# Patient Record
Sex: MALE | Race: OTHER | HISPANIC OR LATINO | URBAN - METROPOLITAN AREA
[De-identification: names, ages, dates, MRNs, and addresses within clinical notes are randomized per-mention and may not be internally consistent; named-entity substitution may affect disease eponyms.]

---

## 2017-04-19 ENCOUNTER — EMERGENCY (EMERGENCY)
Facility: HOSPITAL | Age: 47
LOS: 1 days | Discharge: HOME | End: 2017-04-19
Attending: EMERGENCY MEDICINE | Admitting: EMERGENCY MEDICINE
Payer: SELF-PAY

## 2017-04-19 VITALS
RESPIRATION RATE: 16 BRPM | WEIGHT: 223.99 LBS | DIASTOLIC BLOOD PRESSURE: 94 MMHG | HEART RATE: 89 BPM | OXYGEN SATURATION: 99 % | SYSTOLIC BLOOD PRESSURE: 164 MMHG | HEIGHT: 73 IN

## 2017-04-19 VITALS
HEART RATE: 80 BPM | DIASTOLIC BLOOD PRESSURE: 99 MMHG | OXYGEN SATURATION: 100 % | TEMPERATURE: 98 F | SYSTOLIC BLOOD PRESSURE: 180 MMHG | RESPIRATION RATE: 15 BRPM

## 2017-04-19 LAB
ALBUMIN SERPL ELPH-MCNC: 4.5 G/DL — SIGNIFICANT CHANGE UP (ref 3.3–5)
ALP SERPL-CCNC: 54 U/L — SIGNIFICANT CHANGE UP (ref 40–120)
ALT FLD-CCNC: 41 U/L — SIGNIFICANT CHANGE UP (ref 4–41)
APPEARANCE UR: CLEAR — SIGNIFICANT CHANGE UP
AST SERPL-CCNC: 29 U/L — SIGNIFICANT CHANGE UP (ref 4–40)
BILIRUB SERPL-MCNC: 0.3 MG/DL — SIGNIFICANT CHANGE UP (ref 0.2–1.2)
BILIRUB UR-MCNC: NEGATIVE — SIGNIFICANT CHANGE UP
BLOOD UR QL VISUAL: NEGATIVE — SIGNIFICANT CHANGE UP
BUN SERPL-MCNC: 13 MG/DL — SIGNIFICANT CHANGE UP (ref 7–23)
CALCIUM SERPL-MCNC: 9.9 MG/DL — SIGNIFICANT CHANGE UP (ref 8.4–10.5)
CHLORIDE SERPL-SCNC: 99 MMOL/L — SIGNIFICANT CHANGE UP (ref 98–107)
CO2 SERPL-SCNC: 23 MMOL/L — SIGNIFICANT CHANGE UP (ref 22–31)
COLOR SPEC: SIGNIFICANT CHANGE UP
CREAT SERPL-MCNC: 1.2 MG/DL — SIGNIFICANT CHANGE UP (ref 0.5–1.3)
GLUCOSE SERPL-MCNC: 112 MG/DL — HIGH (ref 70–99)
GLUCOSE UR-MCNC: NEGATIVE — SIGNIFICANT CHANGE UP
HCT VFR BLD CALC: 47.7 % — SIGNIFICANT CHANGE UP (ref 39–50)
HGB BLD-MCNC: 16.4 G/DL — SIGNIFICANT CHANGE UP (ref 13–17)
KETONES UR-MCNC: NEGATIVE — SIGNIFICANT CHANGE UP
LEUKOCYTE ESTERASE UR-ACNC: NEGATIVE — SIGNIFICANT CHANGE UP
MCHC RBC-ENTMCNC: 31.1 PG — SIGNIFICANT CHANGE UP (ref 27–34)
MCHC RBC-ENTMCNC: 34.4 % — SIGNIFICANT CHANGE UP (ref 32–36)
MCV RBC AUTO: 90.5 FL — SIGNIFICANT CHANGE UP (ref 80–100)
MUCOUS THREADS # UR AUTO: SIGNIFICANT CHANGE UP
NITRITE UR-MCNC: NEGATIVE — SIGNIFICANT CHANGE UP
NON-SQ EPI CELLS # UR AUTO: <1 — SIGNIFICANT CHANGE UP
PH UR: 5.5 — SIGNIFICANT CHANGE UP (ref 4.6–8)
PLATELET # BLD AUTO: 189 K/UL — SIGNIFICANT CHANGE UP (ref 150–400)
PMV BLD: 11.9 FL — SIGNIFICANT CHANGE UP (ref 7–13)
POTASSIUM SERPL-MCNC: 4.8 MMOL/L — SIGNIFICANT CHANGE UP (ref 3.5–5.3)
POTASSIUM SERPL-SCNC: 4.8 MMOL/L — SIGNIFICANT CHANGE UP (ref 3.5–5.3)
PROT SERPL-MCNC: 7.5 G/DL — SIGNIFICANT CHANGE UP (ref 6–8.3)
PROT UR-MCNC: 10 — SIGNIFICANT CHANGE UP
RBC # BLD: 5.27 M/UL — SIGNIFICANT CHANGE UP (ref 4.2–5.8)
RBC # FLD: 12.8 % — SIGNIFICANT CHANGE UP (ref 10.3–14.5)
RBC CASTS # UR COMP ASSIST: SIGNIFICANT CHANGE UP (ref 0–?)
SODIUM SERPL-SCNC: 140 MMOL/L — SIGNIFICANT CHANGE UP (ref 135–145)
SP GR SPEC: 1.02 — SIGNIFICANT CHANGE UP (ref 1–1.03)
UROBILINOGEN FLD QL: NORMAL E.U. — SIGNIFICANT CHANGE UP (ref 0.1–0.2)
WBC # BLD: 10.35 K/UL — SIGNIFICANT CHANGE UP (ref 3.8–10.5)
WBC # FLD AUTO: 10.35 K/UL — SIGNIFICANT CHANGE UP (ref 3.8–10.5)
WBC UR QL: SIGNIFICANT CHANGE UP (ref 0–?)

## 2017-04-19 PROCEDURE — 70450 CT HEAD/BRAIN W/O DYE: CPT | Mod: 26

## 2017-04-19 PROCEDURE — 99284 EMERGENCY DEPT VISIT MOD MDM: CPT | Mod: 25

## 2017-04-19 PROCEDURE — 93010 ELECTROCARDIOGRAM REPORT: CPT

## 2017-04-19 RX ORDER — AMLODIPINE BESYLATE 2.5 MG/1
1 TABLET ORAL
Qty: 30 | Refills: 0 | OUTPATIENT
Start: 2017-04-19 | End: 2017-05-19

## 2017-04-19 RX ORDER — SODIUM CHLORIDE 9 MG/ML
1000 INJECTION INTRAMUSCULAR; INTRAVENOUS; SUBCUTANEOUS ONCE
Qty: 0 | Refills: 0 | Status: COMPLETED | OUTPATIENT
Start: 2017-04-19 | End: 2017-04-19

## 2017-04-19 RX ORDER — AMLODIPINE BESYLATE 2.5 MG/1
5 TABLET ORAL ONCE
Qty: 0 | Refills: 0 | Status: COMPLETED | OUTPATIENT
Start: 2017-04-19 | End: 2017-04-19

## 2017-04-19 RX ADMIN — SODIUM CHLORIDE 500 MILLILITER(S): 9 INJECTION INTRAMUSCULAR; INTRAVENOUS; SUBCUTANEOUS at 15:25

## 2017-04-19 RX ADMIN — AMLODIPINE BESYLATE 5 MILLIGRAM(S): 2.5 TABLET ORAL at 16:35

## 2017-04-19 NOTE — ED PROVIDER NOTE - OBJECTIVE STATEMENT
47 yo male with no sig pmh presents with dizziness, n, v. Pt was at work, lifting things and suddenly developed sensation of things spinning and had to hold on to things to walk, vomited twice, initially it was not as bad and he continued to work but then he had to stop and someone took his BP and it was elevated so he was advised to come to the ER. He also states when he turned his head the dizziness was worse but now all gone. He denies CP, SOB, abd pain, numbness, weakness, HA, diplopia and no previous episode of this. He has a FH of HTN but never was told he had it.

## 2017-04-19 NOTE — ED PROVIDER NOTE - NEUROLOGICAL, MLM
Alert and oriented, no focal deficits, no motor or sensory deficits. CN 3-12 intact, FTN normal, sensory intact, motor 5/5 UE and LE

## 2017-04-19 NOTE — ED ADULT TRIAGE NOTE - CHIEF COMPLAINT QUOTE
c/o dizziness with blurry vision and nausea while at work today. Was taken to health office and was told BP reading was high. [id number]

## 2017-04-20 ENCOUNTER — EMERGENCY (EMERGENCY)
Facility: HOSPITAL | Age: 47
LOS: 1 days | Discharge: HOME | End: 2017-04-20
Attending: EMERGENCY MEDICINE | Admitting: EMERGENCY MEDICINE
Payer: SELF-PAY

## 2017-04-20 VITALS
RESPIRATION RATE: 18 BRPM | DIASTOLIC BLOOD PRESSURE: 94 MMHG | OXYGEN SATURATION: 97 % | TEMPERATURE: 98 F | SYSTOLIC BLOOD PRESSURE: 147 MMHG | HEART RATE: 93 BPM

## 2017-04-20 VITALS
OXYGEN SATURATION: 99 % | SYSTOLIC BLOOD PRESSURE: 166 MMHG | RESPIRATION RATE: 16 BRPM | HEART RATE: 79 BPM | DIASTOLIC BLOOD PRESSURE: 94 MMHG

## 2017-04-20 PROBLEM — Z00.00 ENCOUNTER FOR PREVENTIVE HEALTH EXAMINATION: Status: ACTIVE | Noted: 2017-04-20

## 2017-04-20 PROCEDURE — 99285 EMERGENCY DEPT VISIT HI MDM: CPT

## 2017-04-20 RX ORDER — MECLIZINE HCL 12.5 MG
25 TABLET ORAL ONCE
Qty: 0 | Refills: 0 | Status: COMPLETED | OUTPATIENT
Start: 2017-04-20 | End: 2017-04-20

## 2017-04-20 RX ORDER — ONDANSETRON 8 MG/1
4 TABLET, FILM COATED ORAL ONCE
Qty: 0 | Refills: 0 | Status: COMPLETED | OUTPATIENT
Start: 2017-04-20 | End: 2017-04-20

## 2017-04-20 RX ORDER — METOCLOPRAMIDE HCL 10 MG
10 TABLET ORAL ONCE
Qty: 0 | Refills: 0 | Status: COMPLETED | OUTPATIENT
Start: 2017-04-20 | End: 2017-04-20

## 2017-04-20 RX ORDER — DIAZEPAM 5 MG
1 TABLET ORAL
Qty: 10 | Refills: 0 | OUTPATIENT
Start: 2017-04-20 | End: 2017-04-30

## 2017-04-20 RX ADMIN — ONDANSETRON 4 MILLIGRAM(S): 8 TABLET, FILM COATED ORAL at 17:45

## 2017-04-20 RX ADMIN — Medication 25 MILLIGRAM(S): at 17:45

## 2017-04-20 RX ADMIN — Medication 10 MILLIGRAM(S): at 20:26

## 2017-04-20 RX ADMIN — ONDANSETRON 4 MILLIGRAM(S): 8 TABLET, FILM COATED ORAL at 20:51

## 2017-04-20 NOTE — ED PROVIDER NOTE - OBJECTIVE STATEMENT
46M no past medical hx presents with vertigo > 24hr. Pt was seen in ED yesterday for the same complaint - room spinning sensation with nausea and vomiting. CT was neg and pt was d/c home with meclizine. Pt came back today because pt doesn't feel better - not tolerating PO due to nausea and vomiting. Despite the triage note, denies diplopia. Also denies focal neurological deficits.

## 2017-04-20 NOTE — ED ADULT TRIAGE NOTE - CHIEF COMPLAINT QUOTE
Dizziness with diplopia, and NV that started at 1600h today.  Pt evaluated for same yesterday and d/c Dizziness with diplopia, and NV that started at 1600h today.  Pt evaluated for same yesterday and d/c No stroke code called. As per

## 2017-04-20 NOTE — ED ADULT NURSE NOTE - CHIEF COMPLAINT QUOTE
Dizziness with diplopia, and NV that started at 1600h today.  Pt evaluated for same yesterday and d/c No stroke code called. As per

## 2017-04-20 NOTE — ED PROVIDER NOTE - PROGRESS NOTE DETAILS
FIT ATT Dr. Grijalva: 46M neg pmh c/o episodic vertigo x 2 days. Since yesterday patient gets severe room spinning sensation, severe nausea, frequent vomiting, and "my vision bad," specifies cannot see in both eyes. Patient closing both eyes tightly but can see once he open his eyes. CN intact during exam, neg pronator drift. Mcknight leftward gaze during exam elicits severe room spinning, fatiguable nystagmus and vomiting. Patient written for meclizine 25mg and zofran 4mg odt. To be placed in main. HPI and exam favor peripheral vertigo, code stroke not activated. FIT ATT Dr. Grijalva: Vanzant  #[id number] (Citizen of [place]) 46M neg pmh c/o episodic vertigo x 2 days. Since yesterday (> 24 hours ago) patient gets intermittent severe room spinning sensation, severe nausea, frequent vomiting, and "my vision bad," specifies cannot see in both eyes. Patient closing both eyes tightly but can see once he open his eyes. CN intact during exam, neg pronator drift. Mcknight leftward gaze during exam elicits severe room spinning, fatiguable nystagmus and vomiting. Patient written for meclizine 25mg and zofran 4mg odt. To be placed in main. HPI and exam favor peripheral vertigo, code stroke not activated. FIT ATT Dr. Grijalva: Cedar  #[id number] ([place]) 46M neg pmh c/o episodic vertigo x 2 days. Since yesterday (> 24 hours ago) patient gets intermittent severe room spinning sensation, severe nausea, frequent vomiting. Patient does not report blurry or double vision, simply "my vision bad." Specifies cannot see in either eyes. Patient closing both eyes tightly but can see once he open his eyes. CN intact during exam, neg pronator drift. Mcknight leftward gaze during exam elicits severe room spinning, fatiguable nystagmus and vomiting. Patient written for meclizine 25mg and zofran 4mg odt. To be placed in main. HPI and exam favor peripheral vertigo, code stroke not activated. Pt ambulates well, tolerated Apley maneuver. Pt ambulates well, tolerated Apley maneuver. Neuro consulted and saw the pt. Agrees with the plan to DC.

## 2017-04-20 NOTE — ED PROVIDER NOTE - ATTENDING CONTRIBUTION TO CARE
46M p/w room spinning sensation associated with N/V multiple episodes x 1 day.  Was seen here for same, CTH was unremarkable, sent home with meclizine, not improving.  VS:  unremarkable    GEN - malaise, nausea; A+O x3   HEAD - NC/AT     ENT - PEERL, EOMI, mucous membranes dry , no discharge      NECK: Neck supple, non-tender without lymphadenopathy, no masses, no JVD  PULM - CTA b/l,  symmetric breath sounds  COR -  normal heart sounds    ABD - , ND, NT, soft, no guarding, no rebound, no masses    BACK - no CVA tenderness, nontender spine     EXTREMS - no edema, no deformity, warm and well perfused    SKIN - no rash or bruising      NEUROLOGIC - alert, CN 2-12 intact, sensation nl, motor 5/5 RUE/LUE/RLE/LLE.      IMP:  46M p/w vertigo, appears unwell.  Rx meclizine, reglan - minor improvement, still unable to walk.  Neuro eval - did epley maneuver with improvement.  Check labs.  CTH already done, no need for repeat study.

## 2017-04-28 ENCOUNTER — APPOINTMENT (OUTPATIENT)
Dept: INTERNAL MEDICINE | Facility: HOSPITAL | Age: 47
End: 2017-04-28

## 2017-05-17 ENCOUNTER — EMERGENCY (EMERGENCY)
Facility: HOSPITAL | Age: 47
LOS: 1 days | Discharge: HOME | End: 2017-05-17
Attending: EMERGENCY MEDICINE | Admitting: EMERGENCY MEDICINE
Payer: SELF-PAY

## 2017-05-17 VITALS
RESPIRATION RATE: 20 BRPM | OXYGEN SATURATION: 97 % | DIASTOLIC BLOOD PRESSURE: 99 MMHG | TEMPERATURE: 99 F | HEART RATE: 92 BPM | SYSTOLIC BLOOD PRESSURE: 153 MMHG

## 2017-05-17 VITALS
OXYGEN SATURATION: 100 % | HEART RATE: 79 BPM | DIASTOLIC BLOOD PRESSURE: 90 MMHG | SYSTOLIC BLOOD PRESSURE: 154 MMHG | RESPIRATION RATE: 18 BRPM

## 2017-05-17 LAB
ALBUMIN SERPL ELPH-MCNC: 4.3 G/DL — SIGNIFICANT CHANGE UP (ref 3.3–5)
ALP SERPL-CCNC: 54 U/L — SIGNIFICANT CHANGE UP (ref 40–120)
ALT FLD-CCNC: 34 U/L — SIGNIFICANT CHANGE UP (ref 4–41)
AST SERPL-CCNC: 28 U/L — SIGNIFICANT CHANGE UP (ref 4–40)
BILIRUB SERPL-MCNC: 0.3 MG/DL — SIGNIFICANT CHANGE UP (ref 0.2–1.2)
BUN SERPL-MCNC: 16 MG/DL — SIGNIFICANT CHANGE UP (ref 7–23)
CALCIUM SERPL-MCNC: 9.7 MG/DL — SIGNIFICANT CHANGE UP (ref 8.4–10.5)
CHLORIDE SERPL-SCNC: 106 MMOL/L — SIGNIFICANT CHANGE UP (ref 98–107)
CO2 SERPL-SCNC: 23 MMOL/L — SIGNIFICANT CHANGE UP (ref 22–31)
CREAT SERPL-MCNC: 1.18 MG/DL — SIGNIFICANT CHANGE UP (ref 0.5–1.3)
GLUCOSE SERPL-MCNC: 130 MG/DL — HIGH (ref 70–99)
HCT VFR BLD CALC: 47.6 % — SIGNIFICANT CHANGE UP (ref 39–50)
HGB BLD-MCNC: 16.5 G/DL — SIGNIFICANT CHANGE UP (ref 13–17)
MCHC RBC-ENTMCNC: 31.2 PG — SIGNIFICANT CHANGE UP (ref 27–34)
MCHC RBC-ENTMCNC: 34.7 % — SIGNIFICANT CHANGE UP (ref 32–36)
MCV RBC AUTO: 90 FL — SIGNIFICANT CHANGE UP (ref 80–100)
PLATELET # BLD AUTO: 190 K/UL — SIGNIFICANT CHANGE UP (ref 150–400)
PMV BLD: 11.5 FL — SIGNIFICANT CHANGE UP (ref 7–13)
POTASSIUM SERPL-MCNC: 5.6 MMOL/L — HIGH (ref 3.5–5.3)
POTASSIUM SERPL-SCNC: 5.6 MMOL/L — HIGH (ref 3.5–5.3)
PROT SERPL-MCNC: 7.5 G/DL — SIGNIFICANT CHANGE UP (ref 6–8.3)
RBC # BLD: 5.29 M/UL — SIGNIFICANT CHANGE UP (ref 4.2–5.8)
RBC # FLD: 13.2 % — SIGNIFICANT CHANGE UP (ref 10.3–14.5)
SODIUM SERPL-SCNC: 143 MMOL/L — SIGNIFICANT CHANGE UP (ref 135–145)
WBC # BLD: 9.44 K/UL — SIGNIFICANT CHANGE UP (ref 3.8–10.5)
WBC # FLD AUTO: 9.44 K/UL — SIGNIFICANT CHANGE UP (ref 3.8–10.5)

## 2017-05-17 PROCEDURE — 99284 EMERGENCY DEPT VISIT MOD MDM: CPT

## 2017-05-17 RX ORDER — MECLIZINE HCL 12.5 MG
25 TABLET ORAL ONCE
Qty: 0 | Refills: 0 | Status: COMPLETED | OUTPATIENT
Start: 2017-05-17 | End: 2017-05-17

## 2017-05-17 RX ORDER — SODIUM CHLORIDE 9 MG/ML
1000 INJECTION INTRAMUSCULAR; INTRAVENOUS; SUBCUTANEOUS ONCE
Qty: 0 | Refills: 0 | Status: COMPLETED | OUTPATIENT
Start: 2017-05-17 | End: 2017-05-17

## 2017-05-17 RX ORDER — METOCLOPRAMIDE HCL 10 MG
10 TABLET ORAL ONCE
Qty: 0 | Refills: 0 | Status: COMPLETED | OUTPATIENT
Start: 2017-05-17 | End: 2017-05-17

## 2017-05-17 RX ORDER — AMLODIPINE BESYLATE 2.5 MG/1
1 TABLET ORAL
Qty: 30 | Refills: 0 | OUTPATIENT
Start: 2017-05-17 | End: 2017-06-16

## 2017-05-17 RX ORDER — MECLIZINE HCL 12.5 MG
1 TABLET ORAL
Qty: 20 | Refills: 0 | OUTPATIENT
Start: 2017-05-17

## 2017-05-17 RX ADMIN — Medication 10 MILLIGRAM(S): at 09:54

## 2017-05-17 RX ADMIN — SODIUM CHLORIDE 1000 MILLILITER(S): 9 INJECTION INTRAMUSCULAR; INTRAVENOUS; SUBCUTANEOUS at 09:54

## 2017-05-17 RX ADMIN — Medication 25 MILLIGRAM(S): at 09:54

## 2017-05-17 NOTE — ED PROVIDER NOTE - OBJECTIVE STATEMENT
[place] speaking ( I.D# [id number]) 46y M with no significant PMHx, presents to ED with c/o vertigo, and profuse vomiting today with associated dizziness. Pt was seen here last month for same symptoms and improved with meclizine. Pt presents today with identical sx and states he's running out of meclizine. Pt states no dizziness at rest, only with head movement. Denies f/c, diarrhea. Denies recent sick contacts, visual changes, dysarthria, and focal weakness.

## 2017-05-17 NOTE — ED PROVIDER NOTE - PLAN OF CARE
Take meclizine 25mg every 8 hours as needed for dizziness. Follow up with a neurologist within 1-2 weeks. Return to ED for any worsening symptoms.

## 2017-05-17 NOTE — ED PROVIDER NOTE - MEDICAL DECISION MAKING DETAILS
46y M presenting with dizziness and vomiting. Pt had ct on last visit. No acute finding. Exam consistent with peripheral vertigo. Will attempt IV fluids, Reglan and meclizine. If not improve will Epley maneuver. 46y M presenting with vertigo and vomiting. Pt had CT on last visit. No acute finding. Exam consistent with peripheral vertigo. Will attempt IV fluids, Reglan and meclizine. If not improve will Epley maneuver.

## 2017-05-17 NOTE — ED ADULT NURSE NOTE - CHIEF COMPLAINT QUOTE
pt [place] speaking c/o feeling dizzy c/o vomiting c/o elevated b/p pt was evaluated in ER previously treated for vertigo pt states "I ran out of my pills they gave"

## 2017-05-17 NOTE — ED ADULT TRIAGE NOTE - CHIEF COMPLAINT QUOTE
pt Irish speaking c/o feeling dizzy c/o vomiting c/o elevated b/p pt was evaluated in ER previously treated for vertigo pt states "I ran out of my pills they gave"

## 2017-05-17 NOTE — ED PROVIDER NOTE - EYES, MLM
Clear bilaterally, pupils equal, round and reactive to light. leftward nystagmus. extinguishable and fatigable

## 2017-05-17 NOTE — ED ADULT NURSE NOTE - OBJECTIVE STATEMENT
pt AO x3, ambulatory with assist due to dizziness, [place]ian speaking c/o feeling dizzy and vomiting with elevated BP.  pt was evaluated in ER previously treated for vertigo pt states he ran out of his pills they gave. Denies chest pain, SOB on arrival. Evaluated by provider. Blood obtained and sent. IV inserted. 20G right AC. Due medication given as ordered. Will continue to monitor.

## 2017-05-17 NOTE — ED PROVIDER NOTE - PROGRESS NOTE DETAILS
[place] [place]:   K noted to be slightly hemolyzed, EKG done and was wnl.  Pt feeling better, is ambulatory, vss, will dc home with meclizine and neurology follow up. [place] [place]:   K noted to be slightly hemolyzed, pt denied any CP or sob, EKG done and was wnl.  Pt feeling better, is ambulatory, vss, will dc home with meclizine and neurology follow up.

## 2017-05-17 NOTE — ED ADULT NURSE NOTE - CAS EDN DISCHARGE INTERVENTIONS
IV discontinued, cath removed intact/IV removed with no signs of swelling or bruising. Denies pain at site.

## 2017-05-17 NOTE — ED PROVIDER NOTE - CONSTITUTIONAL, MLM
normal... Well appearing, well nourished, awake, alert, oriented to person, place, time/situation and in no apparent distress. Non toxic.

## 2017-05-17 NOTE — ED PROVIDER NOTE - CARE PLAN
Principal Discharge DX:	Vertigo  Instructions for follow-up, activity and diet:	Take meclizine 25mg every 8 hours as needed for dizziness. Follow up with a neurologist within 1-2 weeks. Return to ED for any worsening symptoms.

## 2017-05-17 NOTE — ED PROVIDER NOTE - NS ED MD SCRIBE ATTENDING SCRIBE SECTIONS
HISTORY OF PRESENT ILLNESS/HIV/PHYSICAL EXAM/REVIEW OF SYSTEMS/PAST MEDICAL/SURGICAL/SOCIAL HISTORY/VITAL SIGNS( Pullset)/DISPOSITION

## 2024-02-01 NOTE — ED PROVIDER NOTE - TOBACCO USE
"Patient calling regarding dermatology referral. Per dermatology office, the referral needs to state that it is for \"skin lesion\" and \"please see with priority\". If the referral has that wording then they can see the patient in their office within 2 weeks.    Please notify patient when this has been completed.  " Unknown if ever smoked